# Patient Record
Sex: MALE | URBAN - NONMETROPOLITAN AREA
[De-identification: names, ages, dates, MRNs, and addresses within clinical notes are randomized per-mention and may not be internally consistent; named-entity substitution may affect disease eponyms.]

---

## 2022-08-01 ENCOUNTER — NURSE TRIAGE (OUTPATIENT)
Dept: CALL CENTER | Facility: HOSPITAL | Age: 72
End: 2022-08-01

## 2022-08-01 NOTE — TELEPHONE ENCOUNTER
"Reviewed guideline with caller, advises he be seen within 4 hours. Caller agrees to follow care advice. Caller then asked if Mupericin or Hydrocortisone would be helpful. Advised one is antibiotic, the other is steroid. Advised to check with pharmacist about the use of either. Re-emphasized Mr Denton needs to be evaluated within 4 hours.     Reason for Disposition  • [1] Red streak or red line AND [2] length > 2 inches (5 cm)    Additional Information  • Negative: [1] Life-threatening reaction (anaphylaxis) in the past to sting AND [2] < 2 hours since sting  • Negative: Attacked by swarm of bees now  • Negative: Passed out (i.e., lost consciousness, collapsed and was not responding)  • Negative: Wheezing, stridor, or difficulty breathing  • Negative: [1] Hoarseness or cough AND [2] sudden onset following sting  • Negative: [1] Tightness in the throat or chest AND [2] sudden onset following sting  • Negative: [1] Swollen tongue or difficulty swallowing AND [2] sudden onset following sting  • Negative: Sounds like a life-threatening emergency to the triager  • Negative: Not a bee, wasp, hornet, or yellow jacket sting  • Negative: Ring stuck on swollen finger (or toe) following bee sting  • Negative: [1] Hives, itching, or swelling elsewhere on body (i.e., not a site of sting) AND [2] started within 2 hours of sting (Exception: only at site of sting)  • Negative: [1] Vomiting or abdominal cramps AND [2] started within 2 hours of sting  • Negative: [1] Gave epinephrine shot AND [2] no symptoms now  • Negative: Sting inside the mouth  • Negative: Sting on eyeball (e.g., cornea)  • Negative: Patient sounds very sick or weak to the triager  • Negative: More than 50 stings  • Negative: [1] Fever AND [2] red area  • Negative: [1] Fever AND [2] area is very tender to touch    Answer Assessment - Initial Assessment Questions  1. TYPE: \"What type of sting was it?\" (bee, yellow jacket, etc.)       Yellow jackets   2. ONSET: \"When " "did it occur?\"       5 days ago  3. LOCATION: \"Where is the sting located?\"  \"How many stings?\"      Both arms upper and lower, 5 stings  4. SWELLING SIZE: \"How big is the swelling?\" (e.g., inches or cm)      Size of his palms, 3 inches around  5. REDNESS: \"Is the area red or pink?\" If Yes, ask: \"What size is area of redness?\" (e.g., inches or cm). \"When did the redness start?\"      Red 3 inches in diameter, 2 days ago  6. PAIN: \"Is there any pain?\" If Yes, ask: \"How bad is it?\"  (Scale 1-10; or mild, moderate, severe)      Yes, 6/10  7. ITCHING: \"Is there any itching?\" If Yes, ask: \"How bad is it?\"       Yes, severe  8. RESPIRATORY DISTRESS: \"Describe your breathing.\"      no  9. PRIOR REACTIONS: \"Have you had any severe allergic reactions to stings in the past?\" if yes, ask: \"What happened?\"      no  10. OTHER SYMPTOMS: \"Do you have any other symptoms?\" (e.g., abdominal pain, face or tongue swelling, new rash elsewhere, vomiting)        no  11. PREGNANCY: \"Is there any chance you are pregnant?\" \"When was your last menstrual period?\"        na    Protocols used: BEE OR YELLOW JACKET STING-ADULT-AH      "